# Patient Record
Sex: MALE | Race: BLACK OR AFRICAN AMERICAN | NOT HISPANIC OR LATINO | URBAN - METROPOLITAN AREA
[De-identification: names, ages, dates, MRNs, and addresses within clinical notes are randomized per-mention and may not be internally consistent; named-entity substitution may affect disease eponyms.]

---

## 2017-07-16 ENCOUNTER — OUTSIDE PLACE OF SERVICE (OUTPATIENT)
Dept: ADMINISTRATIVE | Facility: OTHER | Age: 72
End: 2017-07-16

## 2017-09-14 ENCOUNTER — OUTSIDE PLACE OF SERVICE (OUTPATIENT)
Dept: ADMINISTRATIVE | Facility: OTHER | Age: 72
End: 2017-09-14

## 2019-01-14 ENCOUNTER — TELEPHONE (OUTPATIENT)
Dept: FAMILY MEDICINE | Facility: CLINIC | Age: 74
End: 2019-01-14

## 2019-01-14 NOTE — TELEPHONE ENCOUNTER
----- Message from Gerson Mota sent at 1/14/2019  1:20 PM CST -----  Contact: kamala//[facility] [phone]  Caller requested to speak with the nurse and wouldn't say what the call was in regards too. Please call.

## 2019-01-16 ENCOUNTER — TELEPHONE (OUTPATIENT)
Dept: FAMILY MEDICINE | Facility: CLINIC | Age: 74
End: 2019-01-16

## 2019-01-28 ENCOUNTER — TELEPHONE (OUTPATIENT)
Dept: FAMILY MEDICINE | Facility: CLINIC | Age: 74
End: 2019-01-28

## 2019-04-01 ENCOUNTER — TELEPHONE (OUTPATIENT)
Dept: FAMILY MEDICINE | Facility: CLINIC | Age: 74
End: 2019-04-01

## 2019-04-01 NOTE — TELEPHONE ENCOUNTER
Spoke with Brandon in regards to message. She wanted to check the status of orders that were faxed to us. Informed her that fax machine was not working and supplied her with an alternative fax number.

## 2019-04-01 NOTE — TELEPHONE ENCOUNTER
----- Message from Tawnya Oakes sent at 4/1/2019 11:35 AM CDT -----  Contact: Madeline bey/ [facility] [phone]  Nurse requesting to speak with you concerning patient's follow up orders.

## 2019-04-03 ENCOUNTER — TELEPHONE (OUTPATIENT)
Dept: FAMILY MEDICINE | Facility: CLINIC | Age: 74
End: 2019-04-03

## 2019-04-03 NOTE — TELEPHONE ENCOUNTER
----- Message from Rosa Anderson sent at 4/3/2019  1:23 PM CDT -----  Contact: [phone]/Jennifer Sarabia  [facility] is requesting a signed plan of care for patient. Thanks

## 2019-04-17 ENCOUNTER — TELEPHONE (OUTPATIENT)
Dept: FAMILY MEDICINE | Facility: CLINIC | Age: 74
End: 2019-04-17

## 2019-04-17 NOTE — TELEPHONE ENCOUNTER
Spoke with Rupinder at [facility]. Informed her that orders have not been received as of the moment. Fax number provided.

## 2019-04-17 NOTE — TELEPHONE ENCOUNTER
----- Message from Sil Moody sent at 4/17/2019  9:51 AM CDT -----  Contact: Rupinder bey/[facility] [phone]  [facility] is requesting a signed plan of care for patient. Rupinder states the forms have been faxed.  Please call back to assist at [phone]

## 2019-04-22 ENCOUNTER — TELEPHONE (OUTPATIENT)
Dept: FAMILY MEDICINE | Facility: CLINIC | Age: 74
End: 2019-04-22

## 2019-04-22 NOTE — TELEPHONE ENCOUNTER
Orders were placed in Dr. Gonzalez's basket for signature. Currently awaiting completion of documents.

## 2019-04-22 NOTE — TELEPHONE ENCOUNTER
----- Message from Gerson Mota sent at 4/22/2019 11:07 AM CDT -----  Contact: kamala from [employer] [phone]  Caller requested to speak with micaela to follow up on the patient orders. Please call.
